# Patient Record
Sex: MALE | Race: WHITE | Employment: OTHER | URBAN - METROPOLITAN AREA
[De-identification: names, ages, dates, MRNs, and addresses within clinical notes are randomized per-mention and may not be internally consistent; named-entity substitution may affect disease eponyms.]

---

## 2022-01-20 ENCOUNTER — TELEPHONE (OUTPATIENT)
Dept: FAMILY MEDICINE CLINIC | Age: 82
End: 2022-01-20

## 2022-01-20 NOTE — TELEPHONE ENCOUNTER
----- Message from [place], [place] sent at 1/20/2022 10:38 AM EST -----  Subject: Message to Provider    QUESTIONS  Information for Provider? Pt has passed away and wife is calling to get   records for pt regarding his CPAP machine. Please call wife, Catia Jansen to   discuss. ---------------------------------------------------------------------------  --------------  Anni BESNON  What is the best way for the office to contact you? OK to leave message on   voicemail  Preferred Call Back Phone Number? [phone]  ---------------------------------------------------------------------------  --------------  SCRIPT ANSWERS  Relationship to Patient? Other  Representative Name? Wife  Is the Representative on the appropriate HIPAA document in Epic?  Yes

## 2022-01-20 NOTE — TELEPHONE ENCOUNTER
Spoke w/patient's wife  Records requested are for a recall of CPAP  Wife needs notes to check on CPAP compared to his pneumonia, etc  Records collected and mailed to wife